# Patient Record
Sex: FEMALE | Race: BLACK OR AFRICAN AMERICAN | NOT HISPANIC OR LATINO
[De-identification: names, ages, dates, MRNs, and addresses within clinical notes are randomized per-mention and may not be internally consistent; named-entity substitution may affect disease eponyms.]

---

## 2017-10-11 ENCOUNTER — RESULT REVIEW (OUTPATIENT)
Age: 47
End: 2017-10-11

## 2019-12-20 ENCOUNTER — APPOINTMENT (OUTPATIENT)
Dept: RADIOLOGY | Facility: CLINIC | Age: 49
End: 2019-12-20
Payer: COMMERCIAL

## 2019-12-20 ENCOUNTER — APPOINTMENT (OUTPATIENT)
Dept: ULTRASOUND IMAGING | Facility: CLINIC | Age: 49
End: 2019-12-20
Payer: COMMERCIAL

## 2019-12-20 ENCOUNTER — APPOINTMENT (OUTPATIENT)
Dept: MAMMOGRAPHY | Facility: CLINIC | Age: 49
End: 2019-12-20
Payer: COMMERCIAL

## 2019-12-20 ENCOUNTER — OUTPATIENT (OUTPATIENT)
Dept: OUTPATIENT SERVICES | Facility: HOSPITAL | Age: 49
LOS: 1 days | End: 2019-12-20
Payer: COMMERCIAL

## 2019-12-20 DIAGNOSIS — Z12.31 ENCOUNTER FOR SCREENING MAMMOGRAM FOR MALIGNANT NEOPLASM OF BREAST: ICD-10-CM

## 2019-12-20 PROCEDURE — 77063 BREAST TOMOSYNTHESIS BI: CPT | Mod: 26

## 2019-12-20 PROCEDURE — 77067 SCR MAMMO BI INCL CAD: CPT | Mod: 26

## 2019-12-20 PROCEDURE — 76641 ULTRASOUND BREAST COMPLETE: CPT | Mod: 26,50

## 2019-12-20 PROCEDURE — 77067 SCR MAMMO BI INCL CAD: CPT

## 2019-12-20 PROCEDURE — 77080 DXA BONE DENSITY AXIAL: CPT

## 2019-12-20 PROCEDURE — 77080 DXA BONE DENSITY AXIAL: CPT | Mod: 26

## 2019-12-20 PROCEDURE — 76641 ULTRASOUND BREAST COMPLETE: CPT

## 2019-12-20 PROCEDURE — 77063 BREAST TOMOSYNTHESIS BI: CPT

## 2019-12-24 ENCOUNTER — APPOINTMENT (OUTPATIENT)
Dept: MAMMOGRAPHY | Facility: CLINIC | Age: 49
End: 2019-12-24
Payer: COMMERCIAL

## 2019-12-24 ENCOUNTER — APPOINTMENT (OUTPATIENT)
Dept: ULTRASOUND IMAGING | Facility: CLINIC | Age: 49
End: 2019-12-24
Payer: COMMERCIAL

## 2019-12-24 ENCOUNTER — OUTPATIENT (OUTPATIENT)
Dept: OUTPATIENT SERVICES | Facility: HOSPITAL | Age: 49
LOS: 1 days | End: 2019-12-24
Payer: COMMERCIAL

## 2019-12-24 DIAGNOSIS — Z00.8 ENCOUNTER FOR OTHER GENERAL EXAMINATION: ICD-10-CM

## 2019-12-24 PROCEDURE — 76642 ULTRASOUND BREAST LIMITED: CPT | Mod: 26,LT

## 2019-12-24 PROCEDURE — 77065 DX MAMMO INCL CAD UNI: CPT

## 2019-12-24 PROCEDURE — G0279: CPT | Mod: 26

## 2019-12-24 PROCEDURE — 76642 ULTRASOUND BREAST LIMITED: CPT

## 2019-12-24 PROCEDURE — 77065 DX MAMMO INCL CAD UNI: CPT | Mod: 26,LT

## 2019-12-24 PROCEDURE — G0279: CPT

## 2019-12-27 PROBLEM — Z00.00 ENCOUNTER FOR PREVENTIVE HEALTH EXAMINATION: Status: ACTIVE | Noted: 2019-12-27

## 2019-12-31 ENCOUNTER — OUTPATIENT (OUTPATIENT)
Dept: OUTPATIENT SERVICES | Facility: HOSPITAL | Age: 49
LOS: 1 days | End: 2019-12-31
Payer: COMMERCIAL

## 2019-12-31 ENCOUNTER — APPOINTMENT (OUTPATIENT)
Dept: ULTRASOUND IMAGING | Facility: CLINIC | Age: 49
End: 2019-12-31
Payer: COMMERCIAL

## 2019-12-31 ENCOUNTER — RESULT REVIEW (OUTPATIENT)
Age: 49
End: 2019-12-31

## 2019-12-31 DIAGNOSIS — N63.20 UNSPECIFIED LUMP IN THE LEFT BREAST, UNSPECIFIED QUADRANT: ICD-10-CM

## 2019-12-31 PROCEDURE — 88305 TISSUE EXAM BY PATHOLOGIST: CPT | Mod: 26

## 2019-12-31 PROCEDURE — 77065 DX MAMMO INCL CAD UNI: CPT | Mod: 26,LT

## 2019-12-31 PROCEDURE — 88305 TISSUE EXAM BY PATHOLOGIST: CPT

## 2019-12-31 PROCEDURE — 77065 DX MAMMO INCL CAD UNI: CPT

## 2019-12-31 PROCEDURE — 19083 BX BREAST 1ST LESION US IMAG: CPT

## 2019-12-31 PROCEDURE — A4648: CPT

## 2019-12-31 PROCEDURE — 19083 BX BREAST 1ST LESION US IMAG: CPT | Mod: LT

## 2021-01-28 ENCOUNTER — RESULT REVIEW (OUTPATIENT)
Age: 51
End: 2021-01-28

## 2021-04-07 ENCOUNTER — APPOINTMENT (OUTPATIENT)
Dept: ULTRASOUND IMAGING | Facility: CLINIC | Age: 51
End: 2021-04-07
Payer: COMMERCIAL

## 2021-04-07 ENCOUNTER — APPOINTMENT (OUTPATIENT)
Dept: MAMMOGRAPHY | Facility: CLINIC | Age: 51
End: 2021-04-07
Payer: COMMERCIAL

## 2021-04-07 ENCOUNTER — OUTPATIENT (OUTPATIENT)
Dept: OUTPATIENT SERVICES | Facility: HOSPITAL | Age: 51
LOS: 1 days | End: 2021-04-07
Payer: COMMERCIAL

## 2021-04-07 DIAGNOSIS — Z00.8 ENCOUNTER FOR OTHER GENERAL EXAMINATION: ICD-10-CM

## 2021-04-07 PROCEDURE — 77067 SCR MAMMO BI INCL CAD: CPT

## 2021-04-07 PROCEDURE — 77067 SCR MAMMO BI INCL CAD: CPT | Mod: 26

## 2021-04-07 PROCEDURE — 77063 BREAST TOMOSYNTHESIS BI: CPT

## 2021-04-07 PROCEDURE — 76641 ULTRASOUND BREAST COMPLETE: CPT

## 2021-04-07 PROCEDURE — 77063 BREAST TOMOSYNTHESIS BI: CPT | Mod: 26

## 2021-04-07 PROCEDURE — 76641 ULTRASOUND BREAST COMPLETE: CPT | Mod: 26,50

## 2022-09-12 ENCOUNTER — RESULT REVIEW (OUTPATIENT)
Age: 52
End: 2022-09-12

## 2022-10-25 ENCOUNTER — OUTPATIENT (OUTPATIENT)
Dept: OUTPATIENT SERVICES | Facility: HOSPITAL | Age: 52
LOS: 1 days | End: 2022-10-25
Payer: COMMERCIAL

## 2022-10-25 ENCOUNTER — APPOINTMENT (OUTPATIENT)
Dept: RADIOLOGY | Facility: CLINIC | Age: 52
End: 2022-10-25

## 2022-10-25 ENCOUNTER — APPOINTMENT (OUTPATIENT)
Dept: ULTRASOUND IMAGING | Facility: CLINIC | Age: 52
End: 2022-10-25

## 2022-10-25 ENCOUNTER — APPOINTMENT (OUTPATIENT)
Dept: MAMMOGRAPHY | Facility: CLINIC | Age: 52
End: 2022-10-25

## 2022-10-25 DIAGNOSIS — Z00.00 ENCOUNTER FOR GENERAL ADULT MEDICAL EXAMINATION WITHOUT ABNORMAL FINDINGS: ICD-10-CM

## 2022-10-25 DIAGNOSIS — Z00.8 ENCOUNTER FOR OTHER GENERAL EXAMINATION: ICD-10-CM

## 2022-10-25 DIAGNOSIS — Z12.31 ENCOUNTER FOR SCREENING MAMMOGRAM FOR MALIGNANT NEOPLASM OF BREAST: ICD-10-CM

## 2022-10-25 DIAGNOSIS — N60.19 DIFFUSE CYSTIC MASTOPATHY OF UNSPECIFIED BREAST: ICD-10-CM

## 2022-10-25 DIAGNOSIS — Z13.820 ENCOUNTER FOR SCREENING FOR OSTEOPOROSIS: ICD-10-CM

## 2022-10-25 PROCEDURE — 77080 DXA BONE DENSITY AXIAL: CPT

## 2022-10-25 PROCEDURE — 77063 BREAST TOMOSYNTHESIS BI: CPT | Mod: 26

## 2022-10-25 PROCEDURE — 76641 ULTRASOUND BREAST COMPLETE: CPT | Mod: 26,50

## 2022-10-25 PROCEDURE — 76641 ULTRASOUND BREAST COMPLETE: CPT

## 2022-10-25 PROCEDURE — 77080 DXA BONE DENSITY AXIAL: CPT | Mod: 26

## 2022-10-25 PROCEDURE — 77067 SCR MAMMO BI INCL CAD: CPT | Mod: 26

## 2022-10-25 PROCEDURE — 77063 BREAST TOMOSYNTHESIS BI: CPT

## 2022-10-25 PROCEDURE — 77067 SCR MAMMO BI INCL CAD: CPT

## 2023-07-25 ENCOUNTER — NON-APPOINTMENT (OUTPATIENT)
Age: 53
End: 2023-07-25

## 2023-07-25 ENCOUNTER — APPOINTMENT (OUTPATIENT)
Dept: ORTHOPEDIC SURGERY | Facility: CLINIC | Age: 53
End: 2023-07-25
Payer: OTHER MISCELLANEOUS

## 2023-07-25 VITALS — WEIGHT: 245 LBS | BODY MASS INDEX: 40.82 KG/M2 | HEIGHT: 65 IN

## 2023-07-25 DIAGNOSIS — E78.00 PURE HYPERCHOLESTEROLEMIA, UNSPECIFIED: ICD-10-CM

## 2023-07-25 DIAGNOSIS — R73.03 PREDIABETES.: ICD-10-CM

## 2023-07-25 DIAGNOSIS — S56.911A STRAIN OF UNSPECIFIED MUSCLES, FASCIA AND TENDONS AT FOREARM LEVEL, RIGHT ARM, INITIAL ENCOUNTER: ICD-10-CM

## 2023-07-25 DIAGNOSIS — S63.501A UNSPECIFIED SPRAIN OF RIGHT WRIST, INITIAL ENCOUNTER: ICD-10-CM

## 2023-07-25 DIAGNOSIS — I10 ESSENTIAL (PRIMARY) HYPERTENSION: ICD-10-CM

## 2023-07-25 DIAGNOSIS — Z78.9 OTHER SPECIFIED HEALTH STATUS: ICD-10-CM

## 2023-07-25 DIAGNOSIS — Z86.39 PERSONAL HISTORY OF OTHER ENDOCRINE, NUTRITIONAL AND METABOLIC DISEASE: ICD-10-CM

## 2023-07-25 PROCEDURE — 73110 X-RAY EXAM OF WRIST: CPT | Mod: RT

## 2023-07-25 PROCEDURE — 99204 OFFICE O/P NEW MOD 45 MIN: CPT

## 2023-07-25 PROCEDURE — L3908: CPT | Mod: RT

## 2023-07-25 RX ORDER — ATORVASTATIN CALCIUM 20 MG/1
20 TABLET, FILM COATED ORAL
Refills: 0 | Status: ACTIVE | COMMUNITY

## 2023-07-25 RX ORDER — METFORMIN HYDROCHLORIDE 500 MG/1
500 TABLET, COATED ORAL
Refills: 0 | Status: ACTIVE | COMMUNITY

## 2023-07-25 RX ORDER — LEVOTHYROXINE SODIUM 0.1 MG/1
100 TABLET ORAL
Refills: 0 | Status: ACTIVE | COMMUNITY

## 2023-07-25 RX ORDER — AMLODIPINE BESYLATE 10 MG/1
10 TABLET ORAL
Refills: 0 | Status: ACTIVE | COMMUNITY

## 2023-07-25 NOTE — DISCUSSION/SUMMARY
[de-identified] : Discussed the nature of the diagnosis and risk and benefits of different modalities of treatment.\par X-rays reviewed.\par Start night time splinting for CTS.\par All other symptoms are expected to improve with time. \par OOW until next appt. \par RTO 3 weeks. \par

## 2023-07-25 NOTE — PHYSICAL EXAM
[Right] : right hand [Dorsal Wrist] : dorsal wrist [Anatomic Snuff Box] : anatomic snuff box [de-identified] : dorsal proximal forearm tenderness  [] : no pain with range of motion [FreeTextEntry3] : 0.5 cm dorsal wrist mass, firm and slightly mobile

## 2023-07-25 NOTE — WORK
[Sprain/Strain] : sprain/strain [Was the competent medical cause of the injury] : was the competent medical cause of the injury [Are consistent with the injury] : are consistent with the injury [Consistent with my objective findings] : consistent with my objective findings [Partial] : partial [Cannot return to work because ________] : cannot return to work because [unfilled] [I provided the services listed above] :  I provided the services listed above.

## 2023-07-25 NOTE — HISTORY OF PRESENT ILLNESS
[7] : 7 [4] : 4 [Burning] : burning [Dull/Aching] : dull/aching [Radiating] : radiating [Throbbing] : throbbing [Full time] : Work status: full time [de-identified] : 53 year old female presenting with a RIGHT arm and wrist injury. She was assisting a patient to change his clothes and the resident twisted her wrist. Her PCP referred her to ortho. She has pain right away. She is also with numbness which is worse with activity. She also has morning time numbness and is awoken from sleep. No numbness prior to this injury. \par  DOI: 6/30/23 \par Occupation: nursing assistant  [] : Post Surgical Visit: no [FreeTextEntry1] : R wrist R shldr [FreeTextEntry3] : 6/30/23 [FreeTextEntry5] : Pt is a 54 y/o RHD F c/o 1 mo of RUE pain s/p a hyperextension injury on 6/30/23 while at work when a patient twisted her arm. 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

## 2023-08-15 ENCOUNTER — APPOINTMENT (OUTPATIENT)
Dept: ORTHOPEDIC SURGERY | Facility: CLINIC | Age: 53
End: 2023-08-15
Payer: OTHER MISCELLANEOUS

## 2023-08-15 VITALS — WEIGHT: 245 LBS | HEIGHT: 65 IN | BODY MASS INDEX: 40.82 KG/M2

## 2023-08-15 PROCEDURE — 99214 OFFICE O/P EST MOD 30 MIN: CPT

## 2023-08-15 NOTE — HISTORY OF PRESENT ILLNESS
[Work related] : work related [7] : 7 [4] : 4 [Burning] : burning [Dull/Aching] : dull/aching [Sharp] : sharp [Stabbing] : stabbing [Throbbing] : throbbing [Intermittent] : intermittent [Household chores] : household chores [Leisure] : leisure [Sleep] : sleep [Lying in bed] : lying in bed [Not working due to injury] : Work status: not working due to injury [de-identified] : 53-year-old female followed for RT CTS and dorsal gangling. She has been nighttime splinting with improvement in her numbness. She reports she is still having pain in the wrist which radiated up the forearm.  DOI: 6/30/23  [] : no [FreeTextEntry1] : R wrist/shoulder  [FreeTextEntry3] : 6/30/23 [FreeTextEntry5] : 53 year old female is here for a follow up for the R wrist/forearm. Patient states she is unable to sleep at night due to the pain. Pain continues to feel the same since the last visit. Unable to dress and do her daily activities. Patient is requesting an MRI and PT.  Pain is consistent. Patient continues to wear wrist brace.  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

## 2023-08-15 NOTE — PHYSICAL EXAM
[Right] : right hand [Dorsal Wrist] : dorsal wrist [Anatomic Snuff Box] : anatomic snuff box [de-identified] : dorsal proximal forearm tenderness  [] : no pain with range of motion [FreeTextEntry3] : 0.5 cm dorsal wrist mass, firm and slightly mobile

## 2023-08-15 NOTE — WORK
[Partial] : partial [Cannot return to work because ________] : cannot return to work because [unfilled] [I provided the services listed above] :  I provided the services listed above.

## 2023-08-15 NOTE — DISCUSSION/SUMMARY
[de-identified] : Discussed the nature of the diagnosis and risk and benefits of different modalities of treatment. She will continue to nighttime splint.  Will obtain an MRI of the LT wrist.  OOW.  RTO after MRI.

## 2023-08-17 ENCOUNTER — APPOINTMENT (OUTPATIENT)
Dept: MRI IMAGING | Facility: CLINIC | Age: 53
End: 2023-08-17

## 2023-09-19 ENCOUNTER — APPOINTMENT (OUTPATIENT)
Dept: ORTHOPEDIC SURGERY | Facility: CLINIC | Age: 53
End: 2023-09-19
Payer: OTHER MISCELLANEOUS

## 2023-09-19 VITALS — WEIGHT: 245 LBS | HEIGHT: 65 IN | BODY MASS INDEX: 40.82 KG/M2

## 2023-09-19 PROCEDURE — 99214 OFFICE O/P EST MOD 30 MIN: CPT | Mod: 25

## 2023-09-19 PROCEDURE — 20605 DRAIN/INJ JOINT/BURSA W/O US: CPT | Mod: RT

## 2023-10-10 ENCOUNTER — APPOINTMENT (OUTPATIENT)
Dept: ORTHOPEDIC SURGERY | Facility: CLINIC | Age: 53
End: 2023-10-10
Payer: OTHER MISCELLANEOUS

## 2023-10-10 PROCEDURE — 99214 OFFICE O/P EST MOD 30 MIN: CPT

## 2023-10-10 PROCEDURE — 73080 X-RAY EXAM OF ELBOW: CPT | Mod: RT

## 2023-11-07 ENCOUNTER — APPOINTMENT (OUTPATIENT)
Dept: ORTHOPEDIC SURGERY | Facility: CLINIC | Age: 53
End: 2023-11-07
Payer: OTHER MISCELLANEOUS

## 2023-11-07 VITALS — BODY MASS INDEX: 40.82 KG/M2 | HEIGHT: 65 IN | WEIGHT: 245 LBS

## 2023-11-07 PROCEDURE — 99213 OFFICE O/P EST LOW 20 MIN: CPT

## 2023-12-12 ENCOUNTER — APPOINTMENT (OUTPATIENT)
Dept: ORTHOPEDIC SURGERY | Facility: CLINIC | Age: 53
End: 2023-12-12
Payer: OTHER MISCELLANEOUS

## 2023-12-12 PROCEDURE — 99213 OFFICE O/P EST LOW 20 MIN: CPT

## 2024-01-23 ENCOUNTER — APPOINTMENT (OUTPATIENT)
Dept: ORTHOPEDIC SURGERY | Facility: CLINIC | Age: 54
End: 2024-01-23
Payer: OTHER MISCELLANEOUS

## 2024-01-23 PROCEDURE — 99213 OFFICE O/P EST LOW 20 MIN: CPT

## 2024-01-23 NOTE — HISTORY OF PRESENT ILLNESS
[Burning] : burning [Dull/Aching] : dull/aching [Radiating] : radiating [Throbbing] : throbbing [Tingling] : tingling [Full time] : Work status: full time [de-identified] : 53 year old female followed for RT CTS, Wrist synovitis, RT lateral epicondylitis and RT dorsal ganglion. She was given a wrist proper CSI about 3 month ago with good relief. She is also followed for RIGHT lateral epicondylitis.  Has been attending PT and reports good improvement. She has returned to work.  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

## 2024-01-29 ENCOUNTER — APPOINTMENT (OUTPATIENT)
Dept: ULTRASOUND IMAGING | Facility: CLINIC | Age: 54
End: 2024-01-29
Payer: COMMERCIAL

## 2024-01-29 ENCOUNTER — OUTPATIENT (OUTPATIENT)
Dept: OUTPATIENT SERVICES | Facility: HOSPITAL | Age: 54
LOS: 1 days | End: 2024-01-29
Payer: COMMERCIAL

## 2024-01-29 ENCOUNTER — APPOINTMENT (OUTPATIENT)
Dept: MAMMOGRAPHY | Facility: CLINIC | Age: 54
End: 2024-01-29
Payer: COMMERCIAL

## 2024-01-29 DIAGNOSIS — Z00.8 ENCOUNTER FOR OTHER GENERAL EXAMINATION: ICD-10-CM

## 2024-01-29 PROCEDURE — 77063 BREAST TOMOSYNTHESIS BI: CPT

## 2024-01-29 PROCEDURE — 76641 ULTRASOUND BREAST COMPLETE: CPT | Mod: 26,50

## 2024-01-29 PROCEDURE — 77063 BREAST TOMOSYNTHESIS BI: CPT | Mod: 26

## 2024-01-29 PROCEDURE — 76641 ULTRASOUND BREAST COMPLETE: CPT

## 2024-01-29 PROCEDURE — 77067 SCR MAMMO BI INCL CAD: CPT | Mod: 26

## 2024-01-29 PROCEDURE — 77067 SCR MAMMO BI INCL CAD: CPT

## 2024-03-05 ENCOUNTER — APPOINTMENT (OUTPATIENT)
Dept: ORTHOPEDIC SURGERY | Facility: CLINIC | Age: 54
End: 2024-03-05
Payer: OTHER MISCELLANEOUS

## 2024-03-05 PROCEDURE — 99213 OFFICE O/P EST LOW 20 MIN: CPT

## 2024-03-05 NOTE — DISCUSSION/SUMMARY
[de-identified] : Discussed the nature of the diagnosis and risk and benefits of different modalities of treatment. For CTS, she will continue to splint at night for 2 weeks and then d/c. May continue to work full duty.  RTO 4 weeks.

## 2024-03-05 NOTE — WORK
[Partial] : partial [Can return to work without limitations on ______] : can return to work without limitations on [unfilled] [Patient] : patient [No Rx restrictions] : No Rx restrictions. [I provided the services listed above] :  I provided the services listed above.

## 2024-03-05 NOTE — HISTORY OF PRESENT ILLNESS
[Dull/Aching] : dull/aching [Burning] : burning [Radiating] : radiating [Throbbing] : throbbing [Tingling] : tingling [Full time] : Work status: full time [de-identified] : 53 year old female followed for RT CTS, Wrist synovitis, RT lateral epicondylitis and RT dorsal ganglion. She was given a wrist proper CSI, 9/19/23 with good relief. She is also followed for RIGHT lateral epicondylitis. She is continuing to splint at night which is helping her symptoms. Has been attending PT and reports good improvement. She has returned to work. 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

## 2024-04-09 ENCOUNTER — APPOINTMENT (OUTPATIENT)
Dept: ORTHOPEDIC SURGERY | Facility: CLINIC | Age: 54
End: 2024-04-09

## 2024-04-23 ENCOUNTER — APPOINTMENT (OUTPATIENT)
Dept: ORTHOPEDIC SURGERY | Facility: CLINIC | Age: 54
End: 2024-04-23
Payer: OTHER MISCELLANEOUS

## 2024-04-23 VITALS — HEIGHT: 65 IN | BODY MASS INDEX: 40.82 KG/M2 | WEIGHT: 245 LBS

## 2024-04-23 DIAGNOSIS — M77.11 LATERAL EPICONDYLITIS, RIGHT ELBOW: ICD-10-CM

## 2024-04-23 DIAGNOSIS — M67.431 GANGLION, RIGHT WRIST: ICD-10-CM

## 2024-04-23 DIAGNOSIS — G56.01 CARPAL TUNNEL SYNDROME, RIGHT UPPER LIMB: ICD-10-CM

## 2024-04-23 DIAGNOSIS — M65.9 SYNOVITIS AND TENOSYNOVITIS, UNSPECIFIED: ICD-10-CM

## 2024-04-23 PROCEDURE — 99213 OFFICE O/P EST LOW 20 MIN: CPT

## 2024-04-23 NOTE — HISTORY OF PRESENT ILLNESS
[Burning] : burning [Dull/Aching] : dull/aching [Radiating] : radiating [Throbbing] : throbbing [Tingling] : tingling [Full time] : Work status: full time [de-identified] : 53 year old female followed for RT CTS, Wrist synovitis, RT lateral epicondylitis and RT dorsal ganglion. She was given a wrist proper CSI, 9/19/23 with good relief. She is also followed for RIGHT lateral epicondylitis. Has completed PT.  She splinted 2 weeks on the 2 weeks off for CTS. When she d/c splinting, her symptoms returned. She has returned to work. 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

## 2024-04-23 NOTE — DISCUSSION/SUMMARY
[de-identified] : Discussed the nature of the diagnosis and risk and benefits of different modalities of treatment. As night time splinting has not resolved her symptoms, will obtain an EMG to eval for CTS. Elbow symptoms improved. RTO after EMG.

## 2024-07-18 ENCOUNTER — APPOINTMENT (OUTPATIENT)
Dept: NEUROLOGY | Facility: CLINIC | Age: 54
End: 2024-07-18
Payer: OTHER MISCELLANEOUS

## 2024-07-18 PROCEDURE — 95886 MUSC TEST DONE W/N TEST COMP: CPT

## 2024-07-18 PROCEDURE — 95910 NRV CNDJ TEST 7-8 STUDIES: CPT

## 2024-07-23 ENCOUNTER — APPOINTMENT (OUTPATIENT)
Dept: ORTHOPEDIC SURGERY | Facility: CLINIC | Age: 54
End: 2024-07-23
Payer: OTHER MISCELLANEOUS

## 2024-07-23 VITALS — WEIGHT: 245 LBS | HEIGHT: 65 IN | BODY MASS INDEX: 40.82 KG/M2

## 2024-07-23 DIAGNOSIS — G56.01 CARPAL TUNNEL SYNDROME, RIGHT UPPER LIMB: ICD-10-CM

## 2024-07-23 DIAGNOSIS — M67.431 GANGLION, RIGHT WRIST: ICD-10-CM

## 2024-07-23 DIAGNOSIS — M77.11 LATERAL EPICONDYLITIS, RIGHT ELBOW: ICD-10-CM

## 2024-07-23 DIAGNOSIS — M65.9 SYNOVITIS AND TENOSYNOVITIS, UNSPECIFIED: ICD-10-CM

## 2024-07-23 PROCEDURE — 99213 OFFICE O/P EST LOW 20 MIN: CPT

## 2024-07-23 NOTE — DISCUSSION/SUMMARY
[Surgical risks reviewed] : Surgical risks reviewed [de-identified] : Discussed the nature of the diagnosis and risk and benefits of different modalities of treatment. EMG reviewed and discussed.  She meets criteria for CTR.  She would like to try some more OT. Rx provided. She was encouraged to resume night time splinting.  RTO 4 weeks.

## 2024-07-23 NOTE — HISTORY OF PRESENT ILLNESS
[Burning] : burning [Dull/Aching] : dull/aching [Radiating] : radiating [Throbbing] : throbbing [Tingling] : tingling [Full time] : Work status: full time [de-identified] : 53 year old female followed for RT CTS, Wrist synovitis, RT lateral epicondylitis and RT dorsal ganglion. She was given a wrist proper CSI, 9/19/23 with good relief. She is also followed for RIGHT lateral epicondylitis. Has completed PT.  She splinted 2 weeks on the 2 weeks off for CTS. When she d/c splinting, her symptoms returned. She has returned after electrical study. She is working.    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

## 2024-08-20 ENCOUNTER — APPOINTMENT (OUTPATIENT)
Dept: ORTHOPEDIC SURGERY | Facility: CLINIC | Age: 54
End: 2024-08-20
Payer: OTHER MISCELLANEOUS

## 2024-08-20 VITALS — HEIGHT: 65 IN | WEIGHT: 245 LBS | BODY MASS INDEX: 40.82 KG/M2

## 2024-08-20 DIAGNOSIS — G56.01 CARPAL TUNNEL SYNDROME, RIGHT UPPER LIMB: ICD-10-CM

## 2024-08-20 PROCEDURE — 99213 OFFICE O/P EST LOW 20 MIN: CPT

## 2024-08-20 NOTE — HISTORY OF PRESENT ILLNESS
[Burning] : burning [Dull/Aching] : dull/aching [Radiating] : radiating [Throbbing] : throbbing [Tingling] : tingling [Full time] : Work status: full time [de-identified] : 53 year old female followed for RT CTS, Wrist synovitis, RT lateral epicondylitis and RT dorsal ganglion. She was given a wrist proper CSI, 9/19/23 with good relief. She is also followed for RIGHT lateral epicondylitis. Has completed PT.  She splinted 2 weeks on the 2 weeks off for CTS. When she d/c splinting, her symptoms returned. She has returned after electrical study. She is working.   Todays she reports she has the same symptoms. She has been splinting at night and during the day as needed. She is waiting for approval for OT. She is working full duty.   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 wearing brace at nighttime. WC denied OT- has hearing on 8/28/24.  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

## 2024-08-20 NOTE — DISCUSSION/SUMMARY
[Surgical risks reviewed] : Surgical risks reviewed [de-identified] : Discussed the nature of the diagnosis and risk and benefits of different modalities of treatment. RT CTS She has been splinting at night  She will begin OT RTO 4 weeks.

## 2024-09-17 ENCOUNTER — APPOINTMENT (OUTPATIENT)
Dept: ORTHOPEDIC SURGERY | Facility: CLINIC | Age: 54
End: 2024-09-17
Payer: OTHER MISCELLANEOUS

## 2024-09-17 DIAGNOSIS — G56.01 CARPAL TUNNEL SYNDROME, RIGHT UPPER LIMB: ICD-10-CM

## 2024-09-17 PROCEDURE — 99213 OFFICE O/P EST LOW 20 MIN: CPT

## 2024-09-17 NOTE — HISTORY OF PRESENT ILLNESS
[de-identified] : 53 year old female followed for RT CTS, Wrist synovitis, RT lateral epicondylitis and RT dorsal ganglion. She was given a wrist proper CSI, 9/19/23 with good relief. She is also followed for RIGHT lateral epicondylitis. Has completed PT.  She splinted 2 weeks on the 2 weeks off for CTS. When she d/c splinting, her symptoms returned.  *EMG: mild right RTS   Todays she reports she has the same symptoms. She would like to try OT before moving forward with surgery. 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

## 2024-09-17 NOTE — DISCUSSION/SUMMARY
[de-identified] : Discussed the nature of the diagnosis and risk and benefits of different modalities of treatment. RT CTS She has been splinting at night  Pt would like to try OT before moving forward with CTR surgery. She will begin OT Rx provided  RTO 4 weeks.

## 2024-09-17 NOTE — REASON FOR VISIT
[FreeTextEntry2] : right wrist pain 9/17/24: 55 y/o female presents today for follow up evaluation of right wrist. WC DOI: 6/30/23. reports no improvement since last visit. no pain at rest, has pain trying to move wrist. notes numbness in wrist when sleeping.

## 2024-09-24 ENCOUNTER — APPOINTMENT (OUTPATIENT)
Dept: NEUROLOGY | Facility: CLINIC | Age: 54
End: 2024-09-24

## 2024-10-29 ENCOUNTER — APPOINTMENT (OUTPATIENT)
Dept: ORTHOPEDIC SURGERY | Facility: CLINIC | Age: 54
End: 2024-10-29
Payer: OTHER MISCELLANEOUS

## 2024-10-29 VITALS — BODY MASS INDEX: 40.82 KG/M2 | WEIGHT: 245 LBS | HEIGHT: 65 IN

## 2024-10-29 DIAGNOSIS — M65.931 UNSPECIFIED SYNOVITIS AND TENOSYNOVITIS, RIGHT FOREARM: ICD-10-CM

## 2024-10-29 DIAGNOSIS — M67.431 GANGLION, RIGHT WRIST: ICD-10-CM

## 2024-10-29 DIAGNOSIS — M77.11 LATERAL EPICONDYLITIS, RIGHT ELBOW: ICD-10-CM

## 2024-10-29 DIAGNOSIS — G56.01 CARPAL TUNNEL SYNDROME, RIGHT UPPER LIMB: ICD-10-CM

## 2024-10-29 PROCEDURE — 99214 OFFICE O/P EST MOD 30 MIN: CPT

## 2025-03-20 ENCOUNTER — APPOINTMENT (OUTPATIENT)
Dept: ULTRASOUND IMAGING | Facility: CLINIC | Age: 55
End: 2025-03-20
Payer: COMMERCIAL

## 2025-03-20 ENCOUNTER — APPOINTMENT (OUTPATIENT)
Dept: MAMMOGRAPHY | Facility: CLINIC | Age: 55
End: 2025-03-20
Payer: COMMERCIAL

## 2025-03-20 ENCOUNTER — APPOINTMENT (OUTPATIENT)
Dept: RADIOLOGY | Facility: CLINIC | Age: 55
End: 2025-03-20
Payer: COMMERCIAL

## 2025-03-20 ENCOUNTER — OUTPATIENT (OUTPATIENT)
Dept: OUTPATIENT SERVICES | Facility: HOSPITAL | Age: 55
LOS: 1 days | End: 2025-03-20
Payer: COMMERCIAL

## 2025-03-20 DIAGNOSIS — Z00.8 ENCOUNTER FOR OTHER GENERAL EXAMINATION: ICD-10-CM

## 2025-03-20 PROCEDURE — 76641 ULTRASOUND BREAST COMPLETE: CPT | Mod: 26,50

## 2025-03-20 PROCEDURE — 77063 BREAST TOMOSYNTHESIS BI: CPT | Mod: 26

## 2025-03-20 PROCEDURE — 77067 SCR MAMMO BI INCL CAD: CPT | Mod: 26

## 2025-03-20 PROCEDURE — 76641 ULTRASOUND BREAST COMPLETE: CPT

## 2025-03-20 PROCEDURE — 77080 DXA BONE DENSITY AXIAL: CPT | Mod: 26

## 2025-03-20 PROCEDURE — 77063 BREAST TOMOSYNTHESIS BI: CPT

## 2025-03-20 PROCEDURE — 77067 SCR MAMMO BI INCL CAD: CPT

## 2025-03-20 PROCEDURE — 77080 DXA BONE DENSITY AXIAL: CPT
